# Patient Record
Sex: FEMALE | Race: WHITE | NOT HISPANIC OR LATINO | Employment: FULL TIME | URBAN - METROPOLITAN AREA
[De-identification: names, ages, dates, MRNs, and addresses within clinical notes are randomized per-mention and may not be internally consistent; named-entity substitution may affect disease eponyms.]

---

## 2024-01-25 ENCOUNTER — HOSPITAL ENCOUNTER (EMERGENCY)
Facility: HOSPITAL | Age: 39
Discharge: HOME | End: 2024-01-25
Payer: COMMERCIAL

## 2024-01-25 VITALS
TEMPERATURE: 98.6 F | BODY MASS INDEX: 16.48 KG/M2 | HEART RATE: 90 BPM | SYSTOLIC BLOOD PRESSURE: 98 MMHG | OXYGEN SATURATION: 98 % | RESPIRATION RATE: 16 BRPM | WEIGHT: 102.07 LBS | DIASTOLIC BLOOD PRESSURE: 62 MMHG
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
## 2024-01-25 DIAGNOSIS — H53.8 BLURRY VISION, BILATERAL: ICD-10-CM

## 2024-01-25 DIAGNOSIS — H10.213 CHEMICAL CONJUNCTIVITIS OF BOTH EYES: ICD-10-CM

## 2024-01-25 PROCEDURE — 99284 EMERGENCY DEPT VISIT MOD MDM: CPT

## 2024-01-25 PROCEDURE — 99283 EMERGENCY DEPT VISIT LOW MDM: CPT

## 2024-01-25 ASSESSMENT — COLUMBIA-SUICIDE SEVERITY RATING SCALE - C-SSRS
2. HAVE YOU ACTUALLY HAD ANY THOUGHTS OF KILLING YOURSELF?: NO
6. HAVE YOU EVER DONE ANYTHING, STARTED TO DO ANYTHING, OR PREPARED TO DO ANYTHING TO END YOUR LIFE?: NO
1. IN THE PAST MONTH, HAVE YOU WISHED YOU WERE DEAD OR WISHED YOU COULD GO TO SLEEP AND NOT WAKE UP?: NO

## 2024-01-25 ASSESSMENT — VISUAL ACUITY: OU: 1

## 2024-01-25 NOTE — ED PROVIDER NOTES
HPI   Chief Complaint   Patient presents with    Eye Problem     Patient splashed isopropyl alcohol in eye       Is a 38-year-old female who presents with complaint of a work injury when 99% isopropyl alcohol splashed into her eyes she states that both of them are burning.  She has slight blurry vision however she does not have her glasses with her that she requires to see normally.  She has no headache or other injury.  She initially flushed the area out at work for 15 minutes but the pressure was causing her to have pain in the department she appears uncomfortable and covering both eyes with a Kleenex.                          Abad Coma Scale Score: 15                  Patient History   No past medical history on file.  No past surgical history on file.  No family history on file.  Social History     Tobacco Use    Smoking status: Not on file    Smokeless tobacco: Not on file   Substance Use Topics    Alcohol use: Not on file    Drug use: Not on file       Physical Exam   ED Triage Vitals [01/25/24 0755]   Temperature Heart Rate Respirations BP   37 °C (98.6 °F) 90 16 (!) 158/93      Pulse Ox Temp src Heart Rate Source Patient Position   98 % -- -- --      BP Location FiO2 (%)     Left arm --       Physical Exam  Vitals reviewed.   Constitutional:       General: She is not in acute distress.     Appearance: Normal appearance. She is normal weight. She is not ill-appearing, toxic-appearing or diaphoretic.   HENT:      Head: Normocephalic and atraumatic.      Right Ear: External ear normal.      Left Ear: External ear normal.      Nose: Nose normal.   Eyes:      General: Lids are normal. Lids are everted, no foreign bodies appreciated. Vision grossly intact. Gaze aligned appropriately.      Extraocular Movements: Extraocular movements intact.      Right eye: Normal extraocular motion and no nystagmus.      Left eye: Normal extraocular motion and no nystagmus.      Conjunctiva/sclera:      Right eye: Right  conjunctiva is injected.      Left eye: Left conjunctiva is injected.      Pupils: Pupils are equal, round, and reactive to light.      Right eye: No corneal abrasion or fluorescein uptake.      Left eye: No corneal abrasion or fluorescein uptake.   Pulmonary:      Effort: Pulmonary effort is normal. No respiratory distress.      Breath sounds: No stridor.   Abdominal:      General: There is no distension.   Musculoskeletal:         General: No swelling or deformity.      Cervical back: Normal range of motion.   Skin:     Capillary Refill: Capillary refill takes less than 2 seconds.      Findings: No rash.   Neurological:      General: No focal deficit present.      Mental Status: She is alert and oriented to person, place, and time. Mental status is at baseline.   Psychiatric:         Mood and Affect: Mood normal.         Behavior: Behavior normal.         Thought Content: Thought content normal.         Judgment: Judgment normal.         ED Course & MDM   Diagnoses as of 01/25/24 1619   Work related injury   Chemical conjunctivitis of both eyes   Blurry vision, bilateral       Medical Decision Making  Differentials corneal abrasion versus chemical conjunctivitis versus ocular otalgia versus globe rupture    No signs of trauma corneal abrasion or globe rupture    Patient was treated with tetracaine and fluorescein in both eyes no signs of uptake subsequently irrigated with a saline flush to both eyes and then sent to the eyewash station for 15 minutes.  She feels greatly improved    I referenced the MSDS sheet brought by the company which essentially said to irrigate for 15 minutes.    Patient has some blurriness of her vision but again does not have her glasses on to even perform baseline visual acuity, recommend that she follow-up with ophthalmology return for worse concerning symptoms discharged to follow-up with oncologist as well provided with a note.  Ultimately discharged        Procedure  Procedures      Nelson Suresh PA-C  01/25/24 4888

## 2024-01-25 NOTE — Clinical Note
Julian Marino was seen and treated in our emergency department on 1/25/2024.  She may return to work on 01/29/2024.       If you have any questions or concerns, please don't hesitate to call.      Nelson Suresh PA-C